# Patient Record
Sex: FEMALE | Race: WHITE | NOT HISPANIC OR LATINO | Employment: FULL TIME | URBAN - NONMETROPOLITAN AREA
[De-identification: names, ages, dates, MRNs, and addresses within clinical notes are randomized per-mention and may not be internally consistent; named-entity substitution may affect disease eponyms.]

---

## 2024-10-21 ENCOUNTER — OFFICE VISIT (OUTPATIENT)
Dept: OTOLARYNGOLOGY | Facility: CLINIC | Age: 29
End: 2024-10-21
Payer: COMMERCIAL

## 2024-10-21 ENCOUNTER — LAB (OUTPATIENT)
Dept: LAB | Facility: HOSPITAL | Age: 29
End: 2024-10-21
Payer: COMMERCIAL

## 2024-10-21 VITALS
TEMPERATURE: 97.5 F | HEART RATE: 87 BPM | DIASTOLIC BLOOD PRESSURE: 89 MMHG | HEIGHT: 68 IN | SYSTOLIC BLOOD PRESSURE: 131 MMHG | BODY MASS INDEX: 26.52 KG/M2 | WEIGHT: 175 LBS

## 2024-10-21 DIAGNOSIS — H04.203 WATERY EYES: ICD-10-CM

## 2024-10-21 DIAGNOSIS — T78.40XA ALLERGY, INITIAL ENCOUNTER: ICD-10-CM

## 2024-10-21 DIAGNOSIS — H57.89 EYE SWELLING, BILATERAL: ICD-10-CM

## 2024-10-21 DIAGNOSIS — T78.40XA ALLERGY, INITIAL ENCOUNTER: Primary | ICD-10-CM

## 2024-10-21 PROCEDURE — 86003 ALLG SPEC IGE CRUDE XTRC EA: CPT

## 2024-10-21 PROCEDURE — 36415 COLL VENOUS BLD VENIPUNCTURE: CPT

## 2024-10-21 PROCEDURE — 99203 OFFICE O/P NEW LOW 30 MIN: CPT | Performed by: NURSE PRACTITIONER

## 2024-10-21 RX ORDER — ETONOGESTREL AND ETHINYL ESTRADIOL VAGINAL RING .015; .12 MG/D; MG/D
RING VAGINAL
COMMUNITY
Start: 2024-08-08

## 2024-10-21 RX ORDER — SPIRONOLACTONE 50 MG/1
TABLET, FILM COATED ORAL
COMMUNITY
Start: 2024-09-30

## 2024-10-21 RX ORDER — LISDEXAMFETAMINE DIMESYLATE 30 MG/1
CAPSULE ORAL
COMMUNITY
Start: 2024-07-03

## 2024-10-21 NOTE — PATIENT INSTRUCTIONS
CONTACT INFORMATION:  The main office phone number is [phone]. For emergencies after hours and on weekends, this number will convert over to our answering service and the on call provider will answer. Please try to keep non emergent phone calls/ questions to office hours 9am-5pm Monday through Friday.      [employer]l  As an alternative, you can sign up and use the Epic MyChart system for more direct and quicker access for non emergent questions/ problems.  Cheers allows you to send messages to your doctor, view your test results, renew your prescriptions, schedule appointments, and more. To sign up, go to [employer] and click on the Sign Up Now link in the New User? box. Enter your [employer] Activation Code exactly as it appears below along with the last four digits of your Social Security Number and your Date of Birth () to complete the sign-up process. If you do not sign up before the expiration date, you must request a new code.     [employer] Activation Code: Activation code not generated  Current [employer] Status: Active     If you have questions, you can email [email or web address] or call [phone] to talk to our [employer] staff. Remember, [employer] is NOT to be used for urgent needs. For medical emergencies, dial 911.     IF YOU SMOKE OR USE TOBACCO PLEASE READ THE FOLLOWING:  Why is smoking bad for me?  Smoking increases the risk of heart disease, lung disease, vascular disease, stroke, and cancer. If you smoke, STOP!        IF YOU SMOKE OR USE TOBACCO PLEASE READ THE FOLLOWING:  Why is smoking bad for me?  Smoking increases the risk of heart disease, lung disease, vascular disease, stroke, and cancer. If you smoke, STOP!     For more information:  Quit Now [place]  -QUIT-NOW  [email or web address]

## 2024-10-21 NOTE — PROGRESS NOTES
YOB: 1995  Location: [facility]  Location Address: [address] [place] [address], [facility], [address] [place] [address]  Location Phone: [phone]    Chief Complaint   Patient presents with    Allergic Rhinitis       History of Present Illness  Norma Yeh is a 28 y.o. female.  Norma Yeh is here for evaluation of ENT complaints. The patient has had problems with watery eyes and eye swelling. This is intermittent in nature. She has not found any correlation to exposures during these episodes. She has never been allergy tested.  She is unable to wear contact lenses due to symptoms.      History reviewed. No pertinent past medical history.    History reviewed. No pertinent surgical history.    Outpatient Medications Marked as Taking for the 10/21/24 encounter (Office Visit) with Florentin Hairston APRN   Medication Sig Dispense Refill    etonogestrel-ethinyl estradiol (NUVARING) 0.12-0.015 MG/24HR vaginal ring       spironolactone (ALDACTONE) 50 MG tablet       Vyvanse 30 MG capsule          Keflex [cephalexin]    History reviewed. No pertinent family history.    Social History     Socioeconomic History    Marital status:    Tobacco Use    Smoking status: Never    Smokeless tobacco: Never   Vaping Use    Vaping status: Never Used   Substance and Sexual Activity    Alcohol use: Not Currently    Drug use: Defer       Review of Systems   Constitutional: Negative.    HENT:          Intermittent eye swelling       Vitals:    10/21/24 1138   BP: 131/89   Pulse: 87   Temp: 97.5 °F (36.4 °C)       Body mass index is 26.61 kg/m².    Objective     Physical Exam  Vitals reviewed.   Constitutional:       Appearance: Normal appearance.   HENT:      Head: Normocephalic.      Right Ear: Tympanic membrane, ear canal and external ear normal.      Left Ear: Tympanic membrane, ear canal and external ear normal.      Nose: Nose normal.      Mouth/Throat:      Lips: Pink.      Mouth: Mucous membranes are moist.      Pharynx:  Oropharynx is clear.   Neurological:      Mental Status: She is alert.   Psychiatric:         Behavior: Behavior is cooperative.         Assessment & Plan   Diagnoses and all orders for this visit:    1. Allergy, initial encounter (Primary)  -     Allergens (22) Foods; Future  -     Allergens (22) Fruits; Future  -     Allergens (19); Future  -     Allergens (21) Pollens; Future    2. Watery eyes    3. Eye swelling, bilateral      * Surgery not found *  Orders Placed This Encounter   Procedures    Allergens (22) Foods     Standing Status:   Future     Number of Occurrences:   1     Standing Expiration Date:   10/21/2025     Order Specific Question:   Release to patient     Answer:   Routine Release [[id number]]    Allergens (22) Fruits     Standing Status:   Future     Number of Occurrences:   1     Standing Expiration Date:   10/21/2025     Order Specific Question:   Release to patient     Answer:   Routine Release [[id number]]    Allergens (19)     Standing Status:   Future     Number of Occurrences:   1     Standing Expiration Date:   10/21/2025     Order Specific Question:   Release to patient     Answer:   Routine Release [[id number]]    Allergens (21) Pollens     Standing Status:   Future     Number of Occurrences:   1     Standing Expiration Date:   10/21/2025     Order Specific Question:   Release to patient     Answer:   Routine Release [[id number]]     Allergy testing  Consider prick testing  Return for problems    Return for Recheck.       Patient Instructions   CONTACT INFORMATION:  The main office phone number is [phone]. For emergencies after hours and on weekends, this number will convert over to our answering service and the on call provider will answer. Please try to keep non emergent phone calls/ questions to office hours 9am-5pm Monday through Friday.      iCrossing  As an alternative, you can sign up and use the Epic MyChart system for more direct and quicker access for non emergent questions/  problems.  [employer] allows you to send messages to your doctor, view your test results, renew your prescriptions, schedule appointments, and more. To sign up, go to [email or web address] and click on the Sign Up Now link in the New User? box. Enter your [employer] Activation Code exactly as it appears below along with the last four digits of your Social Security Number and your Date of Birth () to complete the sign-up process. If you do not sign up before the expiration date, you must request a new code.     [employer] Activation Code: Activation code not generated  Current [employer] Status: Active     If you have questions, you can email [email or web address] or call [phone] to talk to our [employer] staff. Remember, [employer] is NOT to be used for urgent needs. For medical emergencies, dial 911.     IF YOU SMOKE OR USE TOBACCO PLEASE READ THE FOLLOWING:  Why is smoking bad for me?  Smoking increases the risk of heart disease, lung disease, vascular disease, stroke, and cancer. If you smoke, STOP!        IF YOU SMOKE OR USE TOBACCO PLEASE READ THE FOLLOWING:  Why is smoking bad for me?  Smoking increases the risk of heart disease, lung disease, vascular disease, stroke, and cancer. If you smoke, STOP!     For more information:  Quit Now [place]  QUIT-NOW  [email or web address]

## 2024-10-24 LAB
BAKER'S YEAST IGE QN: <0.1 KU/L
BARLEY IGE QN: <0.1 KU/L
CASEIN IGE QN: <0.1 KU/L
CHEESE MOLD IGE QN: <0.1 KU/L
CLAM IGE QN: <0.1 KU/L
COCOA IGE QN: <0.1 KU/L
CODFISH IGE QN: <0.1 KU/L
CONV CLASS DESCRIPTION: NORMAL
CORN IGE QN: <0.1 KU/L
COW MILK IGE QN: <0.1 KU/L
CRAB IGE QN: <0.1 KU/L
EGG WHITE IGE QN: <0.1 KU/L
GLUTEN IGE QN: <0.1 KU/L
OAT IGE QN: <0.1 KU/L
PEA IGE QN: <0.1 KU/L
PEANUT IGE QN: <0.1 KU/L
PECAN/HICK NUT IGE QN: <0.1 KU/L
RYE IGE QN: <0.1 KU/L
SESAME SEED IGE QN: <0.1 KU/L
SHRIMP IGE QN: <0.1 KU/L
SOYBEAN IGE QN: <0.1 KU/L
WALNUT IGE QN: <0.1 KU/L
WHEAT IGE QN: <0.1 KU/L

## 2024-10-26 LAB
A ALTERNATA IGE QN: <0.1 KU/L
A FUMIGATUS IGE QN: <0.1 KU/L
AMER BEECH IGE QN: <0.1 KU/L
APPLE IGE QN: <0.1 KU/L
APRICOT IGE QN: <0.1 KU/L
BANANA IGE QN: <0.1 KU/L
BERMUDA GRASS IGE QN: <0.1 KU/L
BLACKBERRY IGE QN: <0.1 KU/L
BLUEBERRY IGE QN: <0.1 KU/L
BOXELDER IGE QN: <0.1 KU/L
C ALBICANS IGE QN: <0.1 KU/L
C HERBARUM IGE QN: <0.1 KU/L
CALIF WALNUT POLN IGE QN: 0.14 KU/L
CAT DANDER IGE QN: <0.1 KU/L
CHERRY IGE QN: <0.1 KU/L
CHICKEN FEATHER IGE QN: <0.1 KU/L
CMN PIGWEED IGE QN: <0.1 KU/L
COCKLEBUR IGE QN: 0.19 KU/L
COCONUT IGE QN: <0.1 KU/L
COMMON RAGWEED IGE QN: 1.95 KU/L
CONV CLASS DESCRIPTION: ABNORMAL
CONV CLASS DESCRIPTION: ABNORMAL
CONV CLASS DESCRIPTION: NORMAL
COTTONWOOD IGE QN: <0.1 KU/L
D FARINAE IGE QN: 8.65 KU/L
D PTERONYSS IGE QN: 4.81 KU/L
DOG DANDER IGE QN: <0.1 KU/L
DUCK FEATHER IGE QN: <0.1 KU/L
E PURPURASCENS IGE QN: <0.1 KU/L
ENGL PLANTAIN IGE QN: <0.1 KU/L
F MONILIFORME IGE QN: <0.1 KU/L
GIANT RAGWEED IGE QN: 0.51 KU/L
GOOSE FEATHER IGE QN: <0.1 KU/L
GOOSEFOOT IGE QN: <0.1 KU/L
GRAPE IGE QN: <0.1 KU/L
GRAPEFRUIT IGE QN: <0.1 KU/L
HORSE EPITH IGE QN: <0.1 KU/L
JOHNSON GRASS IGE QN: 0.19 KU/L
KENT BLUE GRASS IGE QN: 1.05 KU/L
KIWIFRUIT IGE QN: <0.1 KU/L
LEMON IGE QN: <0.1 KU/L
LIME IGE QN: <0.1 KU/L
M RACEMOSUS IGE QN: <0.1 KU/L
MANDARIN IGE QN: <0.1 KU/L
MANGO IGE QN: <0.1 KU/L
MELON IGE QN: <0.1 KU/L
MUGWORT IGE QN: <0.1 KU/L
ORANGE IGE QN: <0.1 KU/L
P BETAE IGE QN: <0.1 KU/L
P NOTATUM IGE QN: <0.1 KU/L
PAPAYA IGE QN: <0.1 KU/L
PEACH IGE QN: <0.1 KU/L
PEAR IGE QN: <0.1 KU/L
PECAN/HICK TREE IGE QN: 0.11 KU/L
PINEAPPLE IGE QN: <0.1 KU/L
PLUM IGE QN: <0.1 KU/L
R NIGRICANS IGE QN: <0.1 KU/L
SHEEP SORREL IGE QN: <0.1 KU/L
SILVER BIRCH IGE QN: <0.1 KU/L
STRAWBERRY IGE QN: <0.1 KU/L
T RUBRUM IGE QN: <0.1 KU/L
WEST RAGWEED IGE QN: 0.62 KU/L
WHITE ASH IGE QN: <0.1 KU/L
WHITE ELM IGE QN: <0.1 KU/L
WHITE OAK IGE QN: <0.1 KU/L